# Patient Record
Sex: FEMALE | Race: WHITE | NOT HISPANIC OR LATINO | Employment: OTHER | URBAN - METROPOLITAN AREA
[De-identification: names, ages, dates, MRNs, and addresses within clinical notes are randomized per-mention and may not be internally consistent; named-entity substitution may affect disease eponyms.]

---

## 2018-08-30 ENCOUNTER — HOSPITAL ENCOUNTER (OUTPATIENT)
Dept: PREADMISSION TESTING | Facility: OTHER | Age: 48
Discharge: HOME | End: 2018-08-30
Attending: ORTHOPAEDIC SURGERY
Payer: MEDICARE

## 2018-08-30 ENCOUNTER — ANESTHESIA EVENT (OUTPATIENT)
Dept: SURGERY | Facility: OTHER | Age: 48
DRG: 470 | End: 2018-08-30
Payer: MEDICARE

## 2018-08-30 VITALS
BODY MASS INDEX: 39.27 KG/M2 | SYSTOLIC BLOOD PRESSURE: 133 MMHG | HEART RATE: 69 BPM | WEIGHT: 200 LBS | HEIGHT: 60 IN | DIASTOLIC BLOOD PRESSURE: 82 MMHG | TEMPERATURE: 98 F | OXYGEN SATURATION: 99 %

## 2018-08-30 DIAGNOSIS — T84.84XA PAIN IN PROSTHETIC JOINT, INITIAL ENCOUNTER: Primary | ICD-10-CM

## 2018-08-30 LAB
ABO + RH BLD: NORMAL
BILIRUB UR QL STRIP: NEGATIVE
BLD GP AB SCN CELLS X3 SERPL QL: NORMAL
CLARITY UR: CLEAR
COLOR UR: YELLOW
GLUCOSE UR QL STRIP: NEGATIVE
HGB UR QL STRIP: ABNORMAL
KETONES UR QL STRIP: NEGATIVE
LEUKOCYTE ESTERASE UR QL STRIP: NEGATIVE
NITRITE UR QL STRIP: NEGATIVE
PH UR STRIP: 6 [PH] (ref 5–8)
PROT UR QL STRIP: NEGATIVE
SP GR UR STRIP: >=1.03 (ref 1–1.03)
URN SPEC COLLECT METH UR: ABNORMAL
UROBILINOGEN UR STRIP-ACNC: NEGATIVE EU/DL

## 2018-08-30 PROCEDURE — 87186 SC STD MICRODIL/AGAR DIL: CPT

## 2018-08-30 PROCEDURE — 36415 COLL VENOUS BLD VENIPUNCTURE: CPT

## 2018-08-30 PROCEDURE — 86850 RBC ANTIBODY SCREEN: CPT

## 2018-08-30 PROCEDURE — 87077 CULTURE AEROBIC IDENTIFY: CPT

## 2018-08-30 PROCEDURE — 87088 URINE BACTERIA CULTURE: CPT

## 2018-08-30 PROCEDURE — 81003 URINALYSIS AUTO W/O SCOPE: CPT

## 2018-08-30 PROCEDURE — 87086 URINE CULTURE/COLONY COUNT: CPT

## 2018-08-30 RX ORDER — LIDOCAINE HYDROCHLORIDE 10 MG/ML
0.5 INJECTION, SOLUTION EPIDURAL; INFILTRATION; INTRACAUDAL; PERINEURAL ONCE
Status: CANCELLED | OUTPATIENT
Start: 2018-08-30 | End: 2018-08-30

## 2018-08-30 RX ORDER — MIDAZOLAM HYDROCHLORIDE 5 MG/ML
5 INJECTION INTRAMUSCULAR; INTRAVENOUS ONCE AS NEEDED
Status: CANCELLED | OUTPATIENT
Start: 2018-08-30 | End: 2018-08-30

## 2018-08-30 RX ORDER — SODIUM CHLORIDE, SODIUM LACTATE, POTASSIUM CHLORIDE, CALCIUM CHLORIDE 600; 310; 30; 20 MG/100ML; MG/100ML; MG/100ML; MG/100ML
INJECTION, SOLUTION INTRAVENOUS CONTINUOUS
Status: CANCELLED | OUTPATIENT
Start: 2018-08-30

## 2018-08-30 NOTE — DISCHARGE INSTRUCTIONS
PRE-ADMIT TESTING -  [phone]    [address]  [facility]          Your surgery has been scheduled at [facility]. We are pleased to have the opportunity to serve you. For Further Information please call [phone].    On the day of surgery please report to the Information Desk on the 1st floor.    · CONTACT YOUR PHYSICIAN'S OFFICE THE DAY PRIOR TO YOUR SURGERY TO OBTAIN YOUR ARRIVAL TIME.     · The evening before surgery do not eat anything after 9 p.m. ( this includes hard candy, chewing gum and mints).  You may only have GATORADE, POWERADE AND WATER  from 9 p.m. until you leave your home.   DO NOT DRINK ANY LIQUIDS ON THE WAY TO THE HOSPITAL.      SPECIAL MEDICATION INSTRUCTIONS: TAKE medications checked off by the Anesthesiologist on your Medication List.    Angiogram Patients: Take medications as instructed by your physician, including aspirin.     Surgery Patients:    If you take ASPIRIN - Your PHYSICIAN/SURGEON will need to inform you IF/OR when you need to stop taking aspirin prior to your surgery.     Do Not take any medications containing IBUPROFEN.  Do Not Wear any make-up or dark nail polish   (especially eye make-up) to surgery. If you come to surgery with makeup on you will be required to remove the makeup or nail polish.    Do not shave your surgical area at least 5 days prior to your surgery. The surgical prep will be performed at the hospital according to Infection Control regulations.    Leave all valuables at home.   Do Not wear any jewelry or watches, including any metal in body piercings.  Contact Lens must be removed before surgery. Either do not wear the contact lens or bring a case and solution for storage.  Please bring a container for eyeglasses or dentures as required.  Bring any paperwork your physician has provided, such as consent forms,  history and physicals, doctor's orders, etc.   Bring comfortable clothes that are loose fitting to wear upon  discharge. Take into consideration the type of surgery being performed.  Maintain your diet as advised per your physician the day prior to surgery.      Adequate rest the night before surgery is advised.   Park in the Parking lot behind the hospital or in the [facility] across the street from the parking lot. Parking is complimentary.  If you will be discharged the same day as your procedure, please arrange for a responsible adult to drive you home or to accompany you if traveling by taxi.   YOU WILL NOT BE PERMITTED TO DRIVE OR TO LEAVE THE HOSPITAL ALONE AFTER SURGERY.   It is strongly recommended that you arrange for someone to remain with you for the first 24 hrs following your surgery.       Thank you for your cooperation.  The Staff of [facility].        Bathing Instructions                                                                 Please shower the evening before and morning of your procedure with    ANTIBACTERIAL SOAP. ( DIAL, etc )  Concentrate on the surgical area   for at least 3 minutes and rinse completely. Dry off as usual.   Do not use any deodorant, powder, body lotions, perfume, after shave or    cologne.

## 2018-08-30 NOTE — ANESTHESIA PREPROCEDURE EVALUATION
08/30/2018  Mary Carias is a 47 y.o., female.    Anesthesia Evaluation    I have reviewed the Patient Summary Reports.    I have reviewed the Nursing Notes.   I have reviewed the Medications.     Review of Systems  Anesthesia Hx:  Denies Family Hx of Anesthesia complications.   Denies Personal Hx of Anesthesia complications.   Social:  Non-Smoker    Hematology/Oncology:  Hematology Normal   Oncology Normal     EENT/Dental:EENT/Dental Normal   Cardiovascular:  Cardiovascular Normal     Pulmonary:  Pulmonary Normal    Renal/:  Renal/ Normal     Hepatic/GI:  Hepatic/GI Normal    Musculoskeletal:   Arthritis     Neurological:   Chronic Pain Syndrome   Endocrine:  Endocrine Normal    Dermatological:  Skin Normal    Psych:   Psychiatric History (ADD) depression          Physical Exam  General:  Obesity    Airway/Jaw/Neck:  Airway Findings: Mouth Opening: Normal Mallampati: II  TM Distance: Normal, at least 6 cm      Dental:  Dental Findings: In tact         Mental Status:  Mental Status Findings:  Cooperative, Alert and Oriented         Anesthesia Plan  Type of Anesthesia, risks & benefits discussed:  Anesthesia Type:  spinal  Patient's Preference:   Intra-op Monitoring Plan: standard ASA monitors  Intra-op Monitoring Plan Comments:   Post Op Pain Control Plan: multimodal analgesia  Post Op Pain Control Plan Comments:   Induction:   IV  Beta Blocker:         Informed Consent: Patient understands risks and agrees with Anesthesia plan.  Questions answered. Anesthesia consent signed with patient.  ASA Score: 2     Day of Surgery Review of History & Physical:    H&P update referred to the surgeon.     Anesthesia Plan Notes: Pt requests heavy sedation during procedure        Ready For Surgery From Anesthesia Perspective.

## 2018-09-02 LAB — BACTERIA UR CULT: NORMAL

## 2018-09-07 NOTE — NURSING
Total Joint Replacement Questionnaire     Mary Kaushik Aretha participated in Joint Class Sept 6    1. Have you ever had a Joint Replacement?   []Yes  [x] No    2. How many stairs/steps do you have to enter your home? 1  When going up, on what side is the railing?  [] Left  [] Right  [] Bilateral  [x] None    3. Do you own any Durable Medical Equipment?   [] Rolling Walker  [] Standard Walker  [] Rollator  [] Cane  [] Crutches  [] Bed Side Commode  [] Hip Kit  [] Tub Transfer Bench  [] Shower Chair     4. Have you used a Home Health Company before?   [] Yes  [x] No  If Yes, Name of Company: na  Would you like to use this company again?   [] Yes  [] No  [x] Would you like to use your physician's preference?  [] Yes  [x] No    5. Have you arranged for someone to help you, for at least for 3 days, when you are discharged from the hospital?  [x] Yes  [] No  If Yes, Name(s) of person assisting: Jerry Wiley  9/7/2018

## 2018-09-07 NOTE — DISCHARGE INSTRUCTIONS
Hip Replacement Discharge Instructions    1. Pain:  a. After surgery you may feel some pain in the operative leg groin area. This is normal. Your hip will likely have been injected with a numbing medicine (Exparel) prior to completion of surgery for pain control. This is indicated on a green bracelet that you will wear for 4 days after surgery. You will also get a prescription for pain control before you leave the hospital.  b. Elevate your leg when sitting for comfort  2. Incision Care:  a. Some drainage from the incision in the first 72 hours is normal. If drainage is excessive, remove bandage,  pat dry, cover with sterile gauze, and secure with tape. Notify M.D. for excessive drainage.  b. Staples will be removed 14 days after surgery.  3. Activity:  a. See attached hip precautions and follow for 6 weeks (instructions found at the end of packet):  b. Perform exercises 3 times a day.  c. Use a hard, flat surface, such as a firm mattress, when exercising.  d. You may shower 2 days after surgery providing the dressing is waterproof.. Support/help is mandatory during showering. If dressing becomes wet, replace with a new dressing. No bathing or swimming for 6 weeks or until incision is completely healed.  e. Wear ghulam hose for 3 weeks after surgery. You may remove for 1-2 hours during the day only.Send patient home with an extra pair ghulam hose.  4. Safety:  a. Add cushions to low chairs and car seats for elevation.  b. When getting in and out of a car, it is important to keep your leg straight and out to the side. Wear a seatbelt at all times.  c. You will be given a raised toilet seat (3-1 commode).  d. Use a walker, cane, or crutches as long as M.D. recommends.  5. Possible Complications: Report to Surgeon  a. Infection  i. Unexpected redness  ii. Persistent drainage  iii. Temperature ,can be treated with tylenol. Do not go to the emergency room or urgent care for a temperature, call your surgeon.    iii. Additional swelling  iv. Pain not controlled with current pain medicine  b. Blood clot  i. Unusual pain  ii. Red or discolored skin  iii. Swelling  iv. Unusual warm skin  c. Dislocation  i. Severe hip pain especially when moved  ii. The injured leg is shorter than the uninjured leg  iii. The injured leg lies in an abnormal position. In most cases the leg is bent at the hip, turned inward and pulled toward the middle of the body.

## 2018-09-10 ENCOUNTER — HOSPITAL ENCOUNTER (INPATIENT)
Facility: OTHER | Age: 48
LOS: 1 days | Discharge: HOME HEALTH | DRG: 470 | End: 2018-09-11
Attending: ORTHOPAEDIC SURGERY | Admitting: ORTHOPAEDIC SURGERY
Payer: MEDICARE

## 2018-09-10 ENCOUNTER — ANESTHESIA (OUTPATIENT)
Dept: SURGERY | Facility: OTHER | Age: 48
DRG: 470 | End: 2018-09-10
Payer: MEDICARE

## 2018-09-10 DIAGNOSIS — T84.84XA PAIN IN PROSTHETIC JOINT, INITIAL ENCOUNTER: Primary | ICD-10-CM

## 2018-09-10 LAB
B-HCG UR QL: NEGATIVE
CTP QC/QA: YES

## 2018-09-10 PROCEDURE — 25000003 PHARM REV CODE 250: Performed by: ANESTHESIOLOGY

## 2018-09-10 PROCEDURE — C9290 INJ, BUPIVACAINE LIPOSOME: HCPCS | Performed by: ORTHOPAEDIC SURGERY

## 2018-09-10 PROCEDURE — 97116 GAIT TRAINING THERAPY: CPT

## 2018-09-10 PROCEDURE — 63600175 PHARM REV CODE 636 W HCPCS: Performed by: ANESTHESIOLOGY

## 2018-09-10 PROCEDURE — 25000003 PHARM REV CODE 250: Performed by: NURSE PRACTITIONER

## 2018-09-10 PROCEDURE — 71000033 HC RECOVERY, INTIAL HOUR: Performed by: ORTHOPAEDIC SURGERY

## 2018-09-10 PROCEDURE — 94761 N-INVAS EAR/PLS OXIMETRY MLT: CPT

## 2018-09-10 PROCEDURE — 97530 THERAPEUTIC ACTIVITIES: CPT

## 2018-09-10 PROCEDURE — 63600175 PHARM REV CODE 636 W HCPCS: Performed by: ORTHOPAEDIC SURGERY

## 2018-09-10 PROCEDURE — 71000039 HC RECOVERY, EACH ADD'L HOUR: Performed by: ORTHOPAEDIC SURGERY

## 2018-09-10 PROCEDURE — 37000009 HC ANESTHESIA EA ADD 15 MINS: Performed by: ORTHOPAEDIC SURGERY

## 2018-09-10 PROCEDURE — 0SRB0JA REPLACEMENT OF LEFT HIP JOINT WITH SYNTHETIC SUBSTITUTE, UNCEMENTED, OPEN APPROACH: ICD-10-PCS | Performed by: ORTHOPAEDIC SURGERY

## 2018-09-10 PROCEDURE — 27201423 OPTIME MED/SURG SUP & DEVICES STERILE SUPPLY: Performed by: ORTHOPAEDIC SURGERY

## 2018-09-10 PROCEDURE — 37000008 HC ANESTHESIA 1ST 15 MINUTES: Performed by: ORTHOPAEDIC SURGERY

## 2018-09-10 PROCEDURE — 63600175 PHARM REV CODE 636 W HCPCS: Performed by: NURSE ANESTHETIST, CERTIFIED REGISTERED

## 2018-09-10 PROCEDURE — 25000003 PHARM REV CODE 250: Performed by: ORTHOPAEDIC SURGERY

## 2018-09-10 PROCEDURE — 11000001 HC ACUTE MED/SURG PRIVATE ROOM

## 2018-09-10 PROCEDURE — 25000003 PHARM REV CODE 250: Performed by: NURSE ANESTHETIST, CERTIFIED REGISTERED

## 2018-09-10 PROCEDURE — C1713 ANCHOR/SCREW BN/BN,TIS/BN: HCPCS | Performed by: ORTHOPAEDIC SURGERY

## 2018-09-10 PROCEDURE — 97161 PT EVAL LOW COMPLEX 20 MIN: CPT

## 2018-09-10 PROCEDURE — 63600175 PHARM REV CODE 636 W HCPCS

## 2018-09-10 PROCEDURE — 36000710: Performed by: ORTHOPAEDIC SURGERY

## 2018-09-10 PROCEDURE — 94799 UNLISTED PULMONARY SVC/PX: CPT

## 2018-09-10 PROCEDURE — 25000003 PHARM REV CODE 250

## 2018-09-10 PROCEDURE — P9045 ALBUMIN (HUMAN), 5%, 250 ML: HCPCS | Mod: JG | Performed by: NURSE ANESTHETIST, CERTIFIED REGISTERED

## 2018-09-10 PROCEDURE — 36000711: Performed by: ORTHOPAEDIC SURGERY

## 2018-09-10 PROCEDURE — C1776 JOINT DEVICE (IMPLANTABLE): HCPCS | Performed by: ORTHOPAEDIC SURGERY

## 2018-09-10 PROCEDURE — 81025 URINE PREGNANCY TEST: CPT | Performed by: ANESTHESIOLOGY

## 2018-09-10 DEVICE — HEAD FEMORAL BIOLOX 36MM 0MM: Type: IMPLANTABLE DEVICE | Site: HIP | Status: FUNCTIONAL

## 2018-09-10 DEVICE — SCREW BONE 6.5X25 SELF-TAP: Type: IMPLANTABLE DEVICE | Site: HIP | Status: FUNCTIONAL

## 2018-09-10 DEVICE — LINER POLY 36MM: Type: IMPLANTABLE DEVICE | Site: HIP | Status: FUNCTIONAL

## 2018-09-10 DEVICE — CUP SHELL TM MOD W/CLUST 50MM: Type: IMPLANTABLE DEVICE | Site: HIP | Status: FUNCTIONAL

## 2018-09-10 DEVICE — SCREW BONE 6.5X35 SELF-TAP: Type: IMPLANTABLE DEVICE | Site: HIP | Status: FUNCTIONAL

## 2018-09-10 DEVICE — STEM FEMORAL 12/14 12X128MM TT: Type: IMPLANTABLE DEVICE | Site: HIP | Status: FUNCTIONAL

## 2018-09-10 RX ORDER — ONDANSETRON 2 MG/ML
INJECTION INTRAMUSCULAR; INTRAVENOUS
Status: DISCONTINUED | OUTPATIENT
Start: 2018-09-10 | End: 2018-09-10

## 2018-09-10 RX ORDER — KETOROLAC TROMETHAMINE 30 MG/ML
INJECTION, SOLUTION INTRAMUSCULAR; INTRAVENOUS
Status: DISCONTINUED | OUTPATIENT
Start: 2018-09-10 | End: 2018-09-10 | Stop reason: HOSPADM

## 2018-09-10 RX ORDER — ASPIRIN 325 MG
325 TABLET ORAL EVERY 12 HOURS
Status: DISCONTINUED | OUTPATIENT
Start: 2018-09-11 | End: 2018-09-11 | Stop reason: HOSPADM

## 2018-09-10 RX ORDER — DEXTROSE MONOHYDRATE AND SODIUM CHLORIDE 5; .9 G/100ML; G/100ML
INJECTION, SOLUTION INTRAVENOUS CONTINUOUS
Status: DISCONTINUED | OUTPATIENT
Start: 2018-09-10 | End: 2018-09-11 | Stop reason: HOSPADM

## 2018-09-10 RX ORDER — OXYCODONE HYDROCHLORIDE 5 MG/1
5 TABLET ORAL
Status: DISCONTINUED | OUTPATIENT
Start: 2018-09-10 | End: 2018-09-10 | Stop reason: HOSPADM

## 2018-09-10 RX ORDER — LIDOCAINE HYDROCHLORIDE 10 MG/ML
0.5 INJECTION, SOLUTION EPIDURAL; INFILTRATION; INTRACAUDAL; PERINEURAL ONCE
Status: DISCONTINUED | OUTPATIENT
Start: 2018-09-10 | End: 2018-09-10 | Stop reason: HOSPADM

## 2018-09-10 RX ORDER — CEFAZOLIN SODIUM 2 G/50ML
2 SOLUTION INTRAVENOUS
Status: COMPLETED | OUTPATIENT
Start: 2018-09-10 | End: 2018-09-11

## 2018-09-10 RX ORDER — CEFAZOLIN SODIUM 2 G/50ML
2 SOLUTION INTRAVENOUS
Status: COMPLETED | OUTPATIENT
Start: 2018-09-10 | End: 2018-09-10

## 2018-09-10 RX ORDER — CELECOXIB 200 MG/1
200 CAPSULE ORAL 2 TIMES DAILY
Status: DISCONTINUED | OUTPATIENT
Start: 2018-09-10 | End: 2018-09-11 | Stop reason: HOSPADM

## 2018-09-10 RX ORDER — HYDROMORPHONE HYDROCHLORIDE 1 MG/ML
0.5 INJECTION, SOLUTION INTRAMUSCULAR; INTRAVENOUS; SUBCUTANEOUS EVERY 4 HOURS PRN
Status: DISCONTINUED | OUTPATIENT
Start: 2018-09-10 | End: 2018-09-11 | Stop reason: HOSPADM

## 2018-09-10 RX ORDER — TRANEXAMIC ACID 100 MG/ML
INJECTION, SOLUTION INTRAVENOUS
Status: DISCONTINUED | OUTPATIENT
Start: 2018-09-10 | End: 2018-09-10

## 2018-09-10 RX ORDER — BUPIVACAINE HCL/EPINEPHRINE 0.25-.0005
VIAL (ML) INJECTION
Status: DISCONTINUED | OUTPATIENT
Start: 2018-09-10 | End: 2018-09-10 | Stop reason: HOSPADM

## 2018-09-10 RX ORDER — FENTANYL CITRATE 50 UG/ML
25 INJECTION, SOLUTION INTRAMUSCULAR; INTRAVENOUS EVERY 5 MIN PRN
Status: DISCONTINUED | OUTPATIENT
Start: 2018-09-10 | End: 2018-09-10 | Stop reason: HOSPADM

## 2018-09-10 RX ORDER — HYDROMORPHONE HYDROCHLORIDE 2 MG/ML
0.4 INJECTION, SOLUTION INTRAMUSCULAR; INTRAVENOUS; SUBCUTANEOUS EVERY 5 MIN PRN
Status: DISCONTINUED | OUTPATIENT
Start: 2018-09-10 | End: 2018-09-10 | Stop reason: HOSPADM

## 2018-09-10 RX ORDER — MUPIROCIN 20 MG/G
1 OINTMENT TOPICAL 2 TIMES DAILY
Status: DISCONTINUED | OUTPATIENT
Start: 2018-09-10 | End: 2018-09-11 | Stop reason: HOSPADM

## 2018-09-10 RX ORDER — ROPIVACAINE HYDROCHLORIDE 5 MG/ML
INJECTION, SOLUTION EPIDURAL; INFILTRATION; PERINEURAL
Status: COMPLETED | OUTPATIENT
Start: 2018-09-10 | End: 2018-09-10

## 2018-09-10 RX ORDER — DOCUSATE SODIUM 100 MG/1
100 CAPSULE, LIQUID FILLED ORAL EVERY 12 HOURS
Status: DISCONTINUED | OUTPATIENT
Start: 2018-09-10 | End: 2018-09-11 | Stop reason: HOSPADM

## 2018-09-10 RX ORDER — ONDANSETRON 2 MG/ML
4 INJECTION INTRAMUSCULAR; INTRAVENOUS EVERY 12 HOURS PRN
Status: DISCONTINUED | OUTPATIENT
Start: 2018-09-10 | End: 2018-09-11 | Stop reason: HOSPADM

## 2018-09-10 RX ORDER — ACETAMINOPHEN 10 MG/ML
1000 INJECTION, SOLUTION INTRAVENOUS
Status: COMPLETED | OUTPATIENT
Start: 2018-09-10 | End: 2018-09-10

## 2018-09-10 RX ORDER — POLYETHYLENE GLYCOL 3350 17 G/17G
17 POWDER, FOR SOLUTION ORAL DAILY
Status: DISCONTINUED | OUTPATIENT
Start: 2018-09-11 | End: 2018-09-11 | Stop reason: HOSPADM

## 2018-09-10 RX ORDER — SODIUM CHLORIDE, SODIUM LACTATE, POTASSIUM CHLORIDE, CALCIUM CHLORIDE 600; 310; 30; 20 MG/100ML; MG/100ML; MG/100ML; MG/100ML
INJECTION, SOLUTION INTRAVENOUS CONTINUOUS
Status: DISCONTINUED | OUTPATIENT
Start: 2018-09-10 | End: 2018-09-10

## 2018-09-10 RX ORDER — MIDAZOLAM HYDROCHLORIDE 1 MG/ML
INJECTION INTRAMUSCULAR; INTRAVENOUS
Status: DISCONTINUED | OUTPATIENT
Start: 2018-09-10 | End: 2018-09-10

## 2018-09-10 RX ORDER — SODIUM CHLORIDE 0.9 % (FLUSH) 0.9 %
3 SYRINGE (ML) INJECTION
Status: DISCONTINUED | OUTPATIENT
Start: 2018-09-10 | End: 2018-09-10

## 2018-09-10 RX ORDER — OXYCODONE HYDROCHLORIDE 5 MG/1
20 TABLET ORAL EVERY 4 HOURS PRN
Status: DISCONTINUED | OUTPATIENT
Start: 2018-09-10 | End: 2018-09-11 | Stop reason: HOSPADM

## 2018-09-10 RX ORDER — MEPERIDINE HYDROCHLORIDE 50 MG/ML
12.5 INJECTION INTRAMUSCULAR; INTRAVENOUS; SUBCUTANEOUS ONCE AS NEEDED
Status: DISCONTINUED | OUTPATIENT
Start: 2018-09-10 | End: 2018-09-10 | Stop reason: HOSPADM

## 2018-09-10 RX ORDER — SODIUM CHLORIDE 0.9 % (FLUSH) 0.9 %
5 SYRINGE (ML) INJECTION
Status: DISCONTINUED | OUTPATIENT
Start: 2018-09-10 | End: 2018-09-11 | Stop reason: HOSPADM

## 2018-09-10 RX ORDER — FAMOTIDINE 20 MG/1
20 TABLET, FILM COATED ORAL 2 TIMES DAILY
Status: DISCONTINUED | OUTPATIENT
Start: 2018-09-10 | End: 2018-09-11 | Stop reason: HOSPADM

## 2018-09-10 RX ORDER — ONDANSETRON 2 MG/ML
4 INJECTION INTRAMUSCULAR; INTRAVENOUS DAILY PRN
Status: DISCONTINUED | OUTPATIENT
Start: 2018-09-10 | End: 2018-09-10 | Stop reason: HOSPADM

## 2018-09-10 RX ORDER — BISACODYL 10 MG
10 SUPPOSITORY, RECTAL RECTAL DAILY PRN
Status: DISCONTINUED | OUTPATIENT
Start: 2018-09-10 | End: 2018-09-11 | Stop reason: HOSPADM

## 2018-09-10 RX ORDER — DIPHENHYDRAMINE HYDROCHLORIDE 50 MG/ML
25 INJECTION INTRAMUSCULAR; INTRAVENOUS EVERY 8 HOURS PRN
Status: DISCONTINUED | OUTPATIENT
Start: 2018-09-10 | End: 2018-09-11 | Stop reason: HOSPADM

## 2018-09-10 RX ORDER — FENTANYL CITRATE 50 UG/ML
INJECTION, SOLUTION INTRAMUSCULAR; INTRAVENOUS
Status: DISCONTINUED | OUTPATIENT
Start: 2018-09-10 | End: 2018-09-10

## 2018-09-10 RX ORDER — MIDAZOLAM HYDROCHLORIDE 5 MG/ML
5 INJECTION INTRAMUSCULAR; INTRAVENOUS ONCE AS NEEDED
Status: COMPLETED | OUTPATIENT
Start: 2018-09-10 | End: 2018-09-10

## 2018-09-10 RX ORDER — PROPOFOL 10 MG/ML
VIAL (ML) INTRAVENOUS CONTINUOUS PRN
Status: DISCONTINUED | OUTPATIENT
Start: 2018-09-10 | End: 2018-09-10

## 2018-09-10 RX ORDER — PHENYLEPHRINE HYDROCHLORIDE 10 MG/ML
INJECTION INTRAVENOUS
Status: DISCONTINUED | OUTPATIENT
Start: 2018-09-10 | End: 2018-09-10

## 2018-09-10 RX ORDER — ACETAMINOPHEN 10 MG/ML
1000 INJECTION, SOLUTION INTRAVENOUS EVERY 8 HOURS
Status: DISCONTINUED | OUTPATIENT
Start: 2018-09-10 | End: 2018-09-11 | Stop reason: HOSPADM

## 2018-09-10 RX ORDER — CLONAZEPAM 0.5 MG/1
0.5 TABLET ORAL DAILY PRN
Status: DISCONTINUED | OUTPATIENT
Start: 2018-09-10 | End: 2018-09-11 | Stop reason: HOSPADM

## 2018-09-10 RX ORDER — LIDOCAINE HCL/PF 100 MG/5ML
SYRINGE (ML) INTRAVENOUS
Status: DISCONTINUED | OUTPATIENT
Start: 2018-09-10 | End: 2018-09-10

## 2018-09-10 RX ORDER — DEXAMETHASONE SODIUM PHOSPHATE 4 MG/ML
INJECTION, SOLUTION INTRA-ARTICULAR; INTRALESIONAL; INTRAMUSCULAR; INTRAVENOUS; SOFT TISSUE
Status: DISCONTINUED | OUTPATIENT
Start: 2018-09-10 | End: 2018-09-10

## 2018-09-10 RX ORDER — PROPOFOL 10 MG/ML
VIAL (ML) INTRAVENOUS
Status: DISCONTINUED | OUTPATIENT
Start: 2018-09-10 | End: 2018-09-10

## 2018-09-10 RX ORDER — OXYCODONE HYDROCHLORIDE 5 MG/1
10 TABLET ORAL EVERY 4 HOURS PRN
Status: DISCONTINUED | OUTPATIENT
Start: 2018-09-10 | End: 2018-09-11 | Stop reason: HOSPADM

## 2018-09-10 RX ORDER — OXYCODONE HYDROCHLORIDE 5 MG/1
5 TABLET ORAL EVERY 4 HOURS PRN
Status: DISCONTINUED | OUTPATIENT
Start: 2018-09-10 | End: 2018-09-11 | Stop reason: HOSPADM

## 2018-09-10 RX ORDER — EPHEDRINE SULFATE 50 MG/ML
INJECTION, SOLUTION INTRAVENOUS
Status: DISCONTINUED | OUTPATIENT
Start: 2018-09-10 | End: 2018-09-10

## 2018-09-10 RX ORDER — BACITRACIN 50000 [IU]/1
INJECTION, POWDER, FOR SOLUTION INTRAMUSCULAR
Status: DISCONTINUED | OUTPATIENT
Start: 2018-09-10 | End: 2018-09-10 | Stop reason: HOSPADM

## 2018-09-10 RX ORDER — ALBUMIN HUMAN 50 G/1000ML
SOLUTION INTRAVENOUS CONTINUOUS PRN
Status: DISCONTINUED | OUTPATIENT
Start: 2018-09-10 | End: 2018-09-10

## 2018-09-10 RX ADMIN — ACETAMINOPHEN 1000 MG: 10 INJECTION, SOLUTION INTRAVENOUS at 02:09

## 2018-09-10 RX ADMIN — CEFAZOLIN SODIUM 2 G: 2 SOLUTION INTRAVENOUS at 02:09

## 2018-09-10 RX ADMIN — EPHEDRINE SULFATE 10 MG: 50 INJECTION INTRAMUSCULAR; INTRAVENOUS; SUBCUTANEOUS at 02:09

## 2018-09-10 RX ADMIN — DEXTROSE AND SODIUM CHLORIDE: 5; .9 INJECTION, SOLUTION INTRAVENOUS at 07:09

## 2018-09-10 RX ADMIN — PROPOFOL 125 MCG/KG/MIN: 10 INJECTION, EMULSION INTRAVENOUS at 02:09

## 2018-09-10 RX ADMIN — ALBUMIN (HUMAN): 2.5 SOLUTION INTRAVENOUS at 02:09

## 2018-09-10 RX ADMIN — OXYCODONE HYDROCHLORIDE 20 MG: 5 TABLET ORAL at 09:09

## 2018-09-10 RX ADMIN — MIDAZOLAM HYDROCHLORIDE 2 MG: 1 INJECTION, SOLUTION INTRAMUSCULAR; INTRAVENOUS at 02:09

## 2018-09-10 RX ADMIN — ROPIVACAINE HYDROCHLORIDE 3 ML: 5 INJECTION, SOLUTION EPIDURAL; INFILTRATION; PERINEURAL at 02:09

## 2018-09-10 RX ADMIN — DOCUSATE SODIUM 100 MG: 100 CAPSULE, LIQUID FILLED ORAL at 08:09

## 2018-09-10 RX ADMIN — CEFAZOLIN SODIUM 2 G: 2 SOLUTION INTRAVENOUS at 09:09

## 2018-09-10 RX ADMIN — LIDOCAINE HYDROCHLORIDE 50 MG: 20 INJECTION, SOLUTION INTRAVENOUS at 02:09

## 2018-09-10 RX ADMIN — OXYCODONE HYDROCHLORIDE 10 MG: 5 TABLET ORAL at 08:09

## 2018-09-10 RX ADMIN — CELECOXIB 200 MG: 200 CAPSULE ORAL at 08:09

## 2018-09-10 RX ADMIN — HYDROMORPHONE HYDROCHLORIDE 0.5 MG: 1 INJECTION, SOLUTION INTRAMUSCULAR; INTRAVENOUS; SUBCUTANEOUS at 07:09

## 2018-09-10 RX ADMIN — VANCOMYCIN HYDROCHLORIDE 1250 MG: 1 INJECTION, POWDER, LYOPHILIZED, FOR SOLUTION INTRAVENOUS at 01:09

## 2018-09-10 RX ADMIN — ONDANSETRON 4 MG: 2 INJECTION INTRAMUSCULAR; INTRAVENOUS at 02:09

## 2018-09-10 RX ADMIN — MUPIROCIN 1 G: 20 OINTMENT TOPICAL at 08:09

## 2018-09-10 RX ADMIN — PROPOFOL 50 MG: 10 INJECTION, EMULSION INTRAVENOUS at 02:09

## 2018-09-10 RX ADMIN — FENTANYL CITRATE 100 MCG: 50 INJECTION, SOLUTION INTRAMUSCULAR; INTRAVENOUS at 02:09

## 2018-09-10 RX ADMIN — MIDAZOLAM HYDROCHLORIDE 5 MG: 5 INJECTION, SOLUTION INTRAMUSCULAR; INTRAVENOUS at 01:09

## 2018-09-10 RX ADMIN — OXYCODONE HYDROCHLORIDE 5 MG: 5 TABLET ORAL at 05:09

## 2018-09-10 RX ADMIN — SODIUM CHLORIDE, SODIUM LACTATE, POTASSIUM CHLORIDE, AND CALCIUM CHLORIDE: 600; 310; 30; 20 INJECTION, SOLUTION INTRAVENOUS at 01:09

## 2018-09-10 RX ADMIN — PHENYLEPHRINE HYDROCHLORIDE 100 MCG: 10 INJECTION INTRAVENOUS at 03:09

## 2018-09-10 RX ADMIN — FAMOTIDINE 20 MG: 20 TABLET ORAL at 08:09

## 2018-09-10 RX ADMIN — TRANEXAMIC ACID 1800 MG: 100 INJECTION, SOLUTION INTRAVENOUS at 02:09

## 2018-09-10 RX ADMIN — CLONAZEPAM 0.5 MG: 0.5 TABLET ORAL at 10:09

## 2018-09-10 RX ADMIN — DEXAMETHASONE SODIUM PHOSPHATE 8 MG: 4 INJECTION, SOLUTION INTRAMUSCULAR; INTRAVENOUS at 02:09

## 2018-09-10 RX ADMIN — ACETAMINOPHEN 1000 MG: 10 INJECTION, SOLUTION INTRAVENOUS at 09:09

## 2018-09-10 RX ADMIN — VANCOMYCIN HYDROCHLORIDE 1250 MG: 1 INJECTION, POWDER, LYOPHILIZED, FOR SOLUTION INTRAVENOUS at 02:09

## 2018-09-10 NOTE — ANESTHESIA POSTPROCEDURE EVALUATION
Anesthesia Post Evaluation    Patient: Mary Carias    Procedure(s) Performed: Procedure(s) (LRB):  ARTHROPLASTY, HIP CONVERSION -REVISION TO TOTAL (Left)    Final Anesthesia Type: spinal  Patient location during evaluation: PACU  Patient participation: Yes- Able to Participate  Level of consciousness: oriented and awake  Post-procedure vital signs: reviewed and stable  Pain management: adequate  Airway patency: patent  PONV status at discharge: No PONV  Anesthetic complications: no      Cardiovascular status: hemodynamically stable  Respiratory status: unassisted, spontaneous ventilation and room air  Hydration status: euvolemic  Follow-up not needed.        Visit Vitals  BP (!) 111/58   Pulse 91   Temp 36.6 °C (97.8 °F) (Oral)   Resp 18   Ht 5' (1.524 m)   Wt 90.7 kg (199 lb 16 oz)   SpO2 96%   BMI 39.06 kg/m²       Pain/Flakito Score: Pain Assessment Performed: Yes (9/10/2018  3:53 PM)  Presence of Pain: complains of pain/discomfort (9/10/2018  5:27 PM)  Pain Rating Prior to Med Admin: 4 (9/10/2018  5:27 PM)  Flakito Score: 10 (9/10/2018  5:25 PM)

## 2018-09-10 NOTE — OP NOTE
[facility]  Operative Report    SUMMARY     Surgery Date: 9/10/2018     Surgeon(s) and Role:     * Maicol Hogan MD - Primary    Assistant: ANUM Avlarado FA    Pre-op Diagnosis:  Post-traumatic arthritis Left hip    Post-op Diagnosis:      Same    Procedure(s) (LRB):  ARTHROPLASTY, HIP CONVERSION -REVISION TO TOTAL (Left) (Sarita TM hip)    Anesthesia: Spinal    Description of Procedure: 47 y.o. Female s/p ORIF of acetabulum left hip with post-traumatic arthritis.  Appropriate consent was signed. The patient understood   and accepted all risks and complications. The patient was brought to the Operating Room after undergoing spinal anesthetic. Connelly catheter was placed. The patient was then placed in a lateral decubitus position on a peg board with all bony   prominences padded. Perineal drape was applied. The Operative hip and lower extremity were then prepped and draped in a sterile manner and a mini posterior approach was utilized. Dissection was taken down to fascia, which was incised and   gluteus nikolai muscle split in line of its fibers.The Charnley retractor was then   placed and the hip internally rotated. The external rotators and capsule were   taken off as one flap and peeled back to protect the sciatic nerve. It was   tagged with #5 Ethibond suture. Leg was then levelled and 2 pins were placed, 1  in the greater trochanter and 1 in the iliac crest and distance measured 9.8  cm. Pin was then removed from greater trochanter and hip dislocated. A femoral  neck osteotomy was performed in 20 mm above the lesser trochanter as   templated on preoperative x-rays. Femoral head was removed and proximal femur   was exposed. It was opened up with the cookie cutter, starter reamer and   lateralizing reamer. Trabecular metal reamers were utilized up to 12 mm and   broaching was performed to 12 mm. A thrombin-soaked sponge is placed in the   proximal femur and attention was then directed to the  acetabulum.    Labrum and soft tissue were excised and reaming was begun with a 46 mm reamer   and progressed to 50 mm. A 50 mm press-fit trabecular metal cup with cluster   holes was then impacted obtaining excellent fixation. 2 dome screws were placed  enhancing fixation. A 36 mm neutral highly cross-linked polyethylene liner was  then snapped in the place and checked for loosening. Osteophytes were removed   from around the acetabulum.    Attention was then directed back to the proximal femur where the trial 12 mm   trabecular metal broach was placed and trials were performed. A standard neck   was required along with a 36 mm head plus 0 mm neck. Leg length measured 10.7   cm. Broach is then removed and a 12 mm  trabecular metal stem was then impacted in the proximal femur obtaining excellent fixation. A 36 mm   ceramic head +0 mm neck was then impacted on the dried trunnion   relocated brought through full range of motion and found to be quite stable.   The hip was then washed out copiously with antibiotic solution through the   Pulsavac. The external rotators and capsule were reattached to trochanteric   attachment through drill holes utilizing #5 Ethibond suture. Exparel cocktail   was injected into the fascia and subcutaneous tissue. Fascia was closed with a   running #2 Quill suture. Subcutaneous closure was obtained with #1 and 2-0   Vicryl. Skin was then closed with staples and a sterile compressive dressing   was applied (JAZZ drain). The patient was placed in abduction pillow and brought to Recovery  Room in good condition.    Estimated Blood Loss: 300 cc         Specimens:   Specimen (12h ago, onward)    None

## 2018-09-10 NOTE — TRANSFER OF CARE
Anesthesia Transfer of Care Note    Patient: Mary Carias    Procedure(s) Performed: Procedure(s) (LRB):  ARTHROPLASTY, HIP CONVERSION -REVISION TO TOTAL (Left)    Patient location: PACU    Anesthesia Type: spinal    Transport from OR: Transported from OR on 2-3 L/min O2 by NC with adequate spontaneous ventilation    Post pain: adequate analgesia    Post assessment: no apparent anesthetic complications and tolerated procedure well    Post vital signs: stable    Level of consciousness: awake, alert and oriented    Nausea/Vomiting: no nausea/vomiting    Complications: none    Transfer of care protocol was followed      Last vitals:   Visit Vitals  /61   Pulse 80   Temp 37.1 °C (98.8 °F) (Oral)   Resp 18   Ht 5' (1.524 m)   Wt 90.7 kg (199 lb 16 oz)   SpO2 97%   BMI 39.06 kg/m²

## 2018-09-10 NOTE — PLAN OF CARE
[facility]       [address]       [facility] [address]       [phone]               [facility] Orthopedic Discharge Orders    Home Sam           Expected Discharge Date: 9/11    Diagnoses:  Post-op  left hip(s) replacement    Patient is homebound due to:   Pt requires home health services due to taxing effort to leave the home as a result of immobility from Post-op left hip(s) replacement    Weight Bearing Status:   full weight bearing: left leg     Posterior Hip Precautions for 6 weeks, AVOID:  -Avoid greater than 90 degrees of flexion, internal rotation, and adduction  -Avoid extension, external rotation, and abduction  -Must sleep with hip abduction pillow or regular pillow b/t legs    Physical Therapy   3 times a week for 2 weeks (Call for further orders)  - Ambulate with a rolling walker  - Progress to cane  -Instruct on ROM and strengthening of knee  -Set up for outpt once staples removed and MOD 1 with cane    Wound Care:   If patient is discharged with aqua molly/silver dressing, leave on for 5 days unless saturated border to border, then follow instructions below:  Cleanse with wound cleanser or normal saline and apply island dressing.  If island dressing not available apply gauze and tegaderm.  Change surgical dressing 3 times a week and PRN  in standing position.  Teach patient to change daily if draining.  Pt may shower if surgical dressing is waterproof.. Removal and replacement of dressing after shower only needed if incision is suspected to have gotten wet during shower.  Otherwise change as previously described depending on dressing/drainage. No soaking in the tub or hot tub  for 6 weeks.    Wear  TEDS Bilateral Knee High Stockings for 3  Weeks. OK to remove stockings 1-2 hours/day max if desired.    PT/SN to remove staples 14 days Post-op and apply skin prep and steri-strips.    Cold therapy/Ice: encouraged at least 20 minutes 2-3 times daily or more if desired.   Incision must be kept waterproof while icing.      Contact:  Please contact the nurse practitioner, Milady at [phone] at Ext [phone]. with concerns.  She is in surgery M,W,F so if urgent and needs to be addressed prior to the end of the day call the  and they with contact her in the OR or Clinic.     BLOOD THINNER:    If sent home on Xarelto         -14 days post-op for TKR       -30 days post-op for THR     If sent home home on ASA    325mg   BID x 4 weeks     Once finished with prescribed blood thinner, patients can return to pre-surgical ASA dosage if they took ASA before surgery.     Outpatient Therapy: [facility]    [address] [address]   or  [address] [place] [address]    Call [phone] to schedule appointment  Fax [phone]    If need orders: Call Nicol at Ext [phone]        DME:  - rolling Walker  - 3 in 1 commode  - tub bench / shower chair  - Hip Kit  - Per PT/OT recommendation  - Other:Huan Hogan

## 2018-09-10 NOTE — ANESTHESIA PROCEDURE NOTES
Spinal    Diagnosis: Hip revision  Patient location during procedure: holding area  Start time: 9/10/2018 1:59 PM  Timeout: 9/10/2018 1:57 PM  End time: 9/10/2018 2:10 PM  Staffing  Anesthesiologist: Migue South MD  Performed: anesthesiologist   Preanesthetic Checklist  Completed: patient identified, site marked, surgical consent, pre-op evaluation, timeout performed, IV checked, risks and benefits discussed and monitors and equipment checked  Spinal Block  Patient position: sitting  Prep: ChloraPrep  Patient monitoring: heart rate, cardiac monitor and continuous pulse ox  Approach: right paramedian  Location: L3-4  Injection technique: single shot  CSF Fluid: clear free-flowing CSF  Needle  Needle type: pencil-tip   Needle gauge: 22 G  Needle length: 3.5 in  Additional Documentation: incremental injection, negative aspiration for heme and no paresthesia on injection  Needle localization: anatomical landmarks  Assessment  Sensory level: T8   Dermatomal levels determined by pinch or prick  Ease of block: easy  Patient's tolerance of the procedure: no complaints

## 2018-09-10 NOTE — INTERVAL H&P NOTE
The patient has been examined and the H&P has been reviewed:    I concur with the findings and no changes have occurred since H&P was written.    Anesthesia/Surgery risks, benefits and alternative options discussed and understood by patient/family.          Active Hospital Problems    Diagnosis  POA    Artificial joint pain [T84.84XA]  Yes      Resolved Hospital Problems   No resolved problems to display.

## 2018-09-11 VITALS
OXYGEN SATURATION: 95 % | BODY MASS INDEX: 39.27 KG/M2 | DIASTOLIC BLOOD PRESSURE: 57 MMHG | HEART RATE: 74 BPM | WEIGHT: 200 LBS | SYSTOLIC BLOOD PRESSURE: 112 MMHG | RESPIRATION RATE: 17 BRPM | HEIGHT: 60 IN | TEMPERATURE: 99 F

## 2018-09-11 LAB
ERYTHROCYTE [DISTWIDTH] IN BLOOD BY AUTOMATED COUNT: 13.6 %
HCT VFR BLD AUTO: 36.7 %
HGB BLD-MCNC: 12 G/DL
MCH RBC QN AUTO: 28 PG
MCHC RBC AUTO-ENTMCNC: 32.7 G/DL
MCV RBC AUTO: 86 FL
PLATELET # BLD AUTO: 297 K/UL
PMV BLD AUTO: 9.1 FL
RBC # BLD AUTO: 4.29 M/UL
WBC # BLD AUTO: 15.81 K/UL

## 2018-09-11 PROCEDURE — 25000003 PHARM REV CODE 250: Performed by: ORTHOPAEDIC SURGERY

## 2018-09-11 PROCEDURE — 97535 SELF CARE MNGMENT TRAINING: CPT

## 2018-09-11 PROCEDURE — 25000003 PHARM REV CODE 250

## 2018-09-11 PROCEDURE — 63600175 PHARM REV CODE 636 W HCPCS

## 2018-09-11 PROCEDURE — 97166 OT EVAL MOD COMPLEX 45 MIN: CPT

## 2018-09-11 PROCEDURE — 36415 COLL VENOUS BLD VENIPUNCTURE: CPT

## 2018-09-11 PROCEDURE — 97530 THERAPEUTIC ACTIVITIES: CPT

## 2018-09-11 PROCEDURE — 85027 COMPLETE CBC AUTOMATED: CPT

## 2018-09-11 PROCEDURE — 97116 GAIT TRAINING THERAPY: CPT

## 2018-09-11 RX ORDER — ASPIRIN 325 MG
325 TABLET ORAL EVERY 12 HOURS
Refills: 0
Start: 2018-09-11 | End: 2018-10-11

## 2018-09-11 RX ORDER — OXYCODONE AND ACETAMINOPHEN 10; 325 MG/1; MG/1
TABLET ORAL
Qty: 90 TABLET | Refills: 0 | Status: SHIPPED | OUTPATIENT
Start: 2018-09-11

## 2018-09-11 RX ADMIN — ACETAMINOPHEN 1000 MG: 10 INJECTION, SOLUTION INTRAVENOUS at 05:09

## 2018-09-11 RX ADMIN — POLYETHYLENE GLYCOL 3350 17 G: 17 POWDER, FOR SOLUTION ORAL at 09:09

## 2018-09-11 RX ADMIN — OXYCODONE HYDROCHLORIDE 20 MG: 5 TABLET ORAL at 02:09

## 2018-09-11 RX ADMIN — FAMOTIDINE 20 MG: 20 TABLET ORAL at 09:09

## 2018-09-11 RX ADMIN — CEFAZOLIN SODIUM 2 G: 2 SOLUTION INTRAVENOUS at 05:09

## 2018-09-11 RX ADMIN — MUPIROCIN 1 G: 20 OINTMENT TOPICAL at 09:09

## 2018-09-11 RX ADMIN — ASPIRIN 325 MG ORAL TABLET 325 MG: 325 PILL ORAL at 09:09

## 2018-09-11 RX ADMIN — DOCUSATE SODIUM 100 MG: 100 CAPSULE, LIQUID FILLED ORAL at 09:09

## 2018-09-11 RX ADMIN — OXYCODONE HYDROCHLORIDE 20 MG: 5 TABLET ORAL at 11:09

## 2018-09-11 RX ADMIN — VANCOMYCIN HYDROCHLORIDE 1250 MG: 1 INJECTION, POWDER, LYOPHILIZED, FOR SOLUTION INTRAVENOUS at 02:09

## 2018-09-11 RX ADMIN — OXYCODONE HYDROCHLORIDE 20 MG: 5 TABLET ORAL at 06:09

## 2018-09-11 RX ADMIN — CELECOXIB 200 MG: 200 CAPSULE ORAL at 09:09

## 2018-09-11 NOTE — PT/OT/SLP DISCHARGE
Physical Therapy Discharge Summary    Name: Mary Carias  MRN: [id number]   Principal Problem: Artificial joint pain     Patient Discharged from acute Physical Therapy on 18.  Please refer to prior PT noted date on 18 for functional status.     Assessment:     pt has met 2 out of 5 goals.      Objective:     GOALS:   Multidisciplinary Problems     Physical Therapy Goals        Problem: Physical Therapy Goal    Goal Priority Disciplines Outcome Goal Variances Interventions   Physical Therapy Goal     PT, PT/OT      Description:  Goals to be met by: 18    Patient will increase functional independence with mobility by performin. Supine to sit with SBA   2. Sit to supine with SBA.   3. Sit<>stand transfer with SBA using rolling walker.   4. Gait > 150 feet with SBA using rolling walker. MET 18  5. Ascend/descend 3 stairs with R  handrails with min a MET 18                       Reasons for Discontinuation of Therapy Services  Transfer to alternate level of care.      Plan:     Patient Discharged to: Home with Home Health Service.    Tosin Browne, PT  2018

## 2018-09-11 NOTE — PROGRESS NOTES
Progress Note  Orthopedics    Admit Date: 9/10/2018   Patient ID: Mary Carias is a 47 y.o. female.  POD#1 L THR.  Doing well. Dressing dry, JAZZ drain in place.  Amb 100 ft.  NV intact. OK for DC today.            Maicol Hogan      Vital Sign (recent):  BP (!) 105/57 (BP Location: Right arm, Patient Position: Lying)   Pulse 81   Temp 97.5 °F (36.4 °C) (Oral)   Resp 18   Ht 5' (1.524 m)   Wt 90.7 kg (199 lb 16 oz)   SpO2 96%   BMI 39.06 kg/m²       Laboratory:    CBC:   Recent Labs   Lab  09/11/18   0457   WBC  15.81*   RBC  4.29   HGB  12.0   HCT  36.7*   PLT  297   MCV  86   MCH  28.0   MCHC  32.7       CMP: No results for input(s): GLU, CALCIUM, ALBUMIN, PROT, NA, K, CO2, CL, BUN, CREATININE, ALKPHOS, ALT, AST, BILITOT in the last 168 hours.        Intake/Output Summary (Last 24 hours) at 9/11/2018 0831  Last data filed at 9/11/2018 0530  Gross per 24 hour   Intake 3541.67 ml   Output 2525 ml   Net 1016.67 ml         Current Medications:   acetaminophen  1,000 mg Intravenous Q8H    aspirin  325 mg Oral Q12H    celecoxib  200 mg Oral BID    docusate sodium  100 mg Oral Q12H    famotidine  20 mg Oral BID    mupirocin  1 g Nasal BID    polyethylene glycol  17 g Oral Daily       Continuous Infusions:   dextrose 5 % and 0.9 % NaCl 100 mL/hr at 09/10/18 1948     PRN Meds:.bisacodyl, clonazePAM, diphenhydrAMINE, HYDROmorphone, ondansetron, oxyCODONE, oxyCODONE, oxyCODONE, promethazine (PHENERGAN) IVPB, sodium chloride 0.9%

## 2018-09-11 NOTE — PLAN OF CARE
Problem: Physical Therapy Goal  Goal: Physical Therapy Goal  Goals to be met by: 18    Patient will increase functional independence with mobility by performin. Supine to sit with SBA   2. Sit to supine with SBA.   3. Sit<>stand transfer with SBA using rolling walker.   4. Gait > 150 feet with SBA using rolling walker. MET 18  5. Ascend/descend 3 stairs with R  handrails with min a MET 18       Patient required SBA/CGA for functional mobility

## 2018-09-11 NOTE — PT/OT/SLP PROGRESS
Physical Therapy Treatment    Patient Name:  Mary Carias   MRN:  [id number]    Recommendations:     Discharge Recommendations:  home health PT   Discharge Equipment Recommendations: none(already delivered )   Barriers to discharge: Inaccessible home and Decreased caregiver support    Assessment:     Mary Carias is a 47 y.o. female admitted with a medical diagnosis of Artificial joint pain.  She presents with the following impairments/functional limitations:  weakness, impaired endurance, impaired self care skills, decreased safety awareness, orthopedic precautions, decreased ROM, decreased lower extremity function, impaired functional mobilty patient with poor safety awareness and required constant verbal cues for maintaining posterior precautions. Patient was constantly twisting and reaching throughout treatment; required constant re education.     Rehab Prognosis:  good; patient would benefit from acute skilled PT services to address these deficits and reach maximum level of function.      Recent Surgery: Procedure(s) (LRB):  ARTHROPLASTY, HIP CONVERSION -REVISION TO TOTAL (Left) 1 Day Post-Op    Plan:     During this hospitalization, patient to be seen BID to address the above listed problems via gait training, therapeutic activities, therapeutic exercises  · Plan of Care Expires:  10/10/18   Plan of Care Reviewed with: patient    Subjective     Communicated with nurse prior to session.  Patient found standing in bathroom with out walker upon PT entry to room, agreeable to treatment.      Chief Complaint: patient complained about not having enough pain medication upon discharge   Patient comments/goals: patient reported I'm ready   Pain/Comfort:  · Pain Rating 1: 2/10  · Location - Side 1: Left  · Location - Orientation 1: generalized  · Location 1: hip  · Pain Addressed 1: Pre-medicate for activity, Distraction  · Pain Rating Post-Intervention 1: 0/10(at rest seated in bedside  chair )    Patients cultural, spiritual, Zoroastrianism conflicts given the current situation: none stated    Objective:     Patient found with: peripheral IV     General Precautions: Standard, fall   Orthopedic Precautions:LLE weight bearing as tolerated, LLE posterior precautions   Braces: N/A     Functional Mobility:  · Stand to sit with SBA and verbal cues to maintain posterior precautions and proper technique.   · Sit to stand from bedside commode, bedside chair with Rolling walker with SBA constant verbal cues for hand placement and no twisting.   · Patient gait trained 350 feet with Rolling walker with CGA/SBA verbal cues for safety. Patient required constant verbal cues not to twist hips with turning.     AM-PAC 6 CLICK MOBILITY  Turning over in bed (including adjusting bedclothes, sheets and blankets)?: 3  Sitting down on and standing up from a chair with arms (e.g., wheelchair, bedside commode, etc.): 3  Moving from lying on back to sitting on the side of the bed?: 3  Moving to and from a bed to a chair (including a wheelchair)?: 3  Need to walk in hospital room?: 3  Climbing 3-5 steps with a railing?: 3  Basic Mobility Total Score: 18       Therapeutic Activities and Exercises:  Patient ascend/descend 4 steps with R HR with CGA for safety  Educated patient on the importance of slowing down and making sure to maintain posterior precautions.  Patient was able to perform self cleaning and maintain posterior precautions; patient stood at sink with SBA for hand hygiene.      Patient left up in chair with all lines intact, call button in reach, nurse notified and son present  present..    GOALS:   Multidisciplinary Problems     Physical Therapy Goals        Problem: Physical Therapy Goal    Goal Priority Disciplines Outcome Goal Variances Interventions   Physical Therapy Goal     PT, PT/OT      Description:  Goals to be met by: 9/24/18    Patient will increase functional independence with mobility by  performin. Supine to sit with SBA   2. Sit to supine with SBA.   3. Sit<>stand transfer with SBA using rolling walker.   4. Gait > 150 feet with SBA using rolling walker. MET 18  5. Ascend/descend 3 stairs with R  handrails with min a MET 18                       Time Tracking:     PT Received On: 18  PT Start Time: 1050     PT Stop Time: 1130  PT Total Time (min): 40 min     Billable Minutes: Gait Training 25 and Therapeutic Activity 15    Treatment Type: Treatment  PT/PTA: PTA     PTA Visit Number: 1     Robyn Bruno, PTA  2018

## 2018-09-11 NOTE — PLAN OF CARE
Problem: Physical Therapy Goal  Goal: Physical Therapy Goal  Goals to be met by: 18    Patient will increase functional independence with mobility by performin. Supine to sit with SBA   2. Sit to supine with SBA.   3. Sit<>stand transfer with SBA using rolling walker.   4. Gait > 150 feet with SBA using rolling walker.   5. Ascend/descend 3 stairs with R  handrails with min a    Outcome: Ongoing (interventions implemented as appropriate)  PT eval.  Able to amb with RW and A after given IV dilaudid.  May need both sessions tomorrow  REC:  Home with HH  DME:  Needs RW and 3 in 1.  States they will order TTB on line and thinks she does not need  hip kit

## 2018-09-11 NOTE — NURSING
Dc'ed flavio, clear yellow urine, 525cc, pt verbalized understanding to call for assist when needing to urinate.

## 2018-09-11 NOTE — PLAN OF CARE
Problem: Patient Care Overview  Goal: Plan of Care Review  Outcome: Ongoing (interventions implemented as appropriate)  Pt VSS and afebrile, pt complained of uncontrolled pain throughout shift. Pt positions self in bed. Pt remains with ice to left hip, muniz removed at 0600. Pt family at bedside, in low locked bed, call light in reach, safety precautions maintained, will continue to monitor.

## 2018-09-11 NOTE — PLAN OF CARE
Patient accepted by [facility]      09/11/18 1049   Final Note   Assessment Type Final Discharge Note   Discharge Disposition Home-Health   What phone number can be called within the next 1-3 days to see how you are doing after discharge? [phone]   Discharge plans and expectations educations in teach back method with documentation complete? Yes   Right Care Referral Info   Post Acute Recommendation Home-care   Facility Name [facility]

## 2018-09-11 NOTE — PLAN OF CARE
DME - delivered 9/11/18 Room 358 [id number]  One - BS-commmicky   One - Jr. Rolling Walker   One - Trans Bench  One - Short- Hip Kit Jamie Ballard, [facility] [place]  Case Management  [phone]

## 2018-09-11 NOTE — PROGRESS NOTES
IM  Progress Note    Admit Date: 9/10/2018   LOS: 1 day     SUBJECTIVE:     Follow-up For:  Artificial joint pain    Interval History:     POD #1 left hip repair.  Patient seen just after working with OT.  Denies CP,SOB,F,C,N or V.  No calf tenderness. Due to void, will likely discharge today.    Review of Systems:  Constitutional: No fever or chills  Respiratory: No cough or shortness of breath  Cardiovascular: No chest pain or palpitations  Gastrointestinal: No nausea or vomiting  Neurological: No confusion or weakness    OBJECTIVE:     Vital Signs Range (Last 24H):  BP (!) 105/57 (BP Location: Right arm, Patient Position: Lying)   Pulse 81   Temp 97.5 °F (36.4 °C) (Oral)   Resp 18   Ht 5' (1.524 m)   Wt 90.7 kg (199 lb 16 oz)   SpO2 96%   BMI 39.06 kg/m²     Temp:  [97.5 °F (36.4 °C)-98.8 °F (37.1 °C)]   Pulse:  []   Resp:  [18]   BP: (101-134)/(52-69)   SpO2:  [94 %-100 %]     I & O (Last 24H):    Intake/Output Summary (Last 24 hours) at 9/11/2018 0844  Last data filed at 9/11/2018 0530  Gross per 24 hour   Intake 3541.67 ml   Output 2525 ml   Net 1016.67 ml       Physical Exam:  General appearance: Well developed, well nourished  Eyes:  Conjunctivae/corneas clear. PERRL.  Lungs: Normal respiratory effort,   clear to auscultation bilaterally   Heart: Regular rate and rhythm, S1, S2 normal, no murmur, rub or nadia.  Abdomen: Soft, non-tender non-distended; bowel sounds normal; no masses,  no organomegaly  Extremities: No cyanosis or clubbing. No edema.  +2 pulses BLE  Skin: Skin color, texture, turgor normal. No rashes or lesions, JAZZ dressing left hip CDI  Neurologic: Normal strength and tone. No focal numbness or weakness     Laboratory Data:  Recent Labs   Lab  09/11/18   0457   WBC  15.81*   RBC  4.29   HGB  12.0   HCT  36.7*   PLT  297   MCV  86   MCH  28.0   MCHC  32.7       BMP: No results for input(s): GLU, NA, K, CL, CO2, BUN, CREATININE, CALCIUM, MG in the last 168 hours.    Invalid  input(s):  PHOS  Lab Results   Component Value Date    CALCIUM 9.8 08/06/2018               Medications:  Medication list was reviewed and changes noted under Assessment/Plan.    Diagnostic Results:        ASSESSMENT/PLAN:     1. Left hip repair (T84.84XA): POD #1 per therapy and ortho teams  2. Leukocytosis (D72.829): noted on pre-op labs, continued today.  No fever, chills or cough, likely due to surgical intervention.  3. Elevated hemoglobin (D58.2): noted on pre-op labs, resolved today  4. Anxiety (F41.9): continue home med but once daily PRN dosing  5. ADD (F98.8): defer  6. Vit D Deficiency (E55.9): resume ergocalciferol after discharge  7. DVT prophy: will discharge on  mg BID and TEDs per ortho    Medically stable for discharge today

## 2018-09-11 NOTE — PLAN OF CARE
Problem: Occupational Therapy Goal  Goal: Occupational Therapy Goal  Goals to be met by: 10/11/18     Patient will increase functional independence with ADLs by performing:    Grooming while standing at sink with Supervision.  Toileting from bedside commode with Stand-by Assistance for hygiene and clothing management.   Toilet transfer to bedside commode with Stand-by Assistance.    Outcome: Ongoing (interventions implemented as appropriate)  OT evaluation completed and treatment initiated.  Pt would benefit from skilled occupational therapy intervention for increased activity tolerance and independence in ADL's.

## 2018-09-11 NOTE — PT/OT/SLP EVAL
Physical Therapy Evaluation and treatment    Patient Name:  Mary Carias   MRN:  [id number]    Recommendations:     Discharge Recommendations:  home with home health, home health PT, home health OT   Discharge Equipment Recommendations: 3-in-1 commode, bath bench, hip kit, walker, rolling( States they will order TTB on line and thinks she does not need  hip kit )   Barriers to discharge: Inaccessible home    Assessment:     Mary Carias is a 47 y.o. female admitted with a medical diagnosis of Artificial joint pain.  She presents with the following impairments/functional limitations:  weakness, impaired endurance, impaired functional mobilty, decreased safety awareness, pain, decreased lower extremity function, decreased ROM, orthopedic precautions .  Repeated cues for observation of hip precautions and safety, requiring IV pain meds, may need both sessions tomorrow    Rehab Prognosis:  good; patient would benefit from acute skilled PT services to address these deficits and reach maximum level of function.      Recent Surgery: Procedure(s) (LRB):  ARTHROPLASTY, HIP CONVERSION -REVISION TO TOTAL (Left) Day of Surgery    Plan:     During this hospitalization, patient to be seen BID to address the above listed problems via gait training, therapeutic activities, therapeutic exercises  · Plan of Care Expires:  10/10/18   Plan of Care Reviewed with: patient, friend    Subjective     Communicated with nurse prior to session.  Patient found sitting up in bed  upon PT entry to room, agreeable to evaluation.  Pt had ice packs sitting on skin with nothing between ice packs and skin, skin very red.  Instructed pt that can not be placed directly on skin    Chief Complaint: hip and thigh pain  Patient comments/goals: states she put the ice on her thigh cause it was killing her.  OK lets do this.  Bam look at me.  I got this.  At end of session-now Im ready for some more Dilaudid.  I need that to  "take away my pain.   Pain/Comfort:  · Pain Rating 1: 5/10  · Location - Side 1: Left  · Location - Orientation 1: generalized  · Location 1: hip(and thigh)  · Pain Addressed 1: Pre-medicate for activity, Reposition, Distraction, Cessation of Activity, Nurse notified(given IV Dilaudid prior to treatment but requesting more at end of session)  · Pain Rating Post-Intervention 1: 4/10    Patients cultural, spiritual, Sikhism conflicts given the current situation: none stated    Living Environment:  Pt lives with spouse in 1 story house with 3 RUTH [address] and no HR.  Tub shower combo and low toilet.  When asked if spouse works pt stated "sometimes"  Prior to admission, patients level of function was I PTA, no AD, drives, would order groceries on line and drive to , can heat up food, cant stand for long to cook.   Friend who is present states  would put pt shoes and socks on. .  Patient has the following equipment: none.  DME owned (not currently used): none.  Upon discharge, patient will have assistance from friend( who will be staying for 1 week and limited from   spouse     Objective:     Patient found with: hip abduction pillow, muniz catheter, peripheral IV, SCD(JAZZ vac)     General Precautions: Standard, fall   Orthopedic Precautions:LLE weight bearing as tolerated, LLE posterior precautions   Braces: N/A     Exams:  · Cognitive Exam:  Patient is oriented to Person, Place, Time and Situation  · Gross Motor Coordination:  impaired in timing  · Postural Exam:  Patient presented with the following abnormalities:    · -       Rounded shoulders  · Sensation:    · -       Intact  light/touch BLE  · Skin Integrity/Edema:      · -       Skin integrity: L hip inciaon bandaged and JAZZ vac in place.  skin on thigh  is red from ice pack  · -       Edema: L thigh   · RLE ROM: WFL  · RLE Strength: WFL  · LLE ROM: Deficits: decreased 2* pain and hip precautions, ankle WFL\  · LLE Strength: Deficits: fair quad " contraction for glut and quad, assist to move leg, ankle WFL     Functional Mobility:  · Bed Mobility:     · Supine to Sit: moderate assistance and cues for hip precautions  · Sit to Supine: minimum assistance with side rail  · Transfers:     · Sit to Stand:  minimum assistance with rolling walker and cues for hand placement and observation of hip precautions  · Gait: pt amb 100' with RW and CGA.  Instructed in heel toe reciprocal gait.  Initially step to gait stopping after each step.  assist to push RW to try to get pt to fluid step thru gait.  Decreased bonita.  Cues for hip precautions-will start to turn around to look at friend with feet planted   · Balance: fair standing with RW    AM-PAC 6 CLICK MOBILITY  Total Score:16       Therapeutic Activities and Exercises:   PT eval.  Instructed in hip precautions, gt sequence and walker management.  Required repeated reinforcement of all.  Instruct in and performed 1 set of 10  ankle pumps  Quad sets  Glut sets      Patient left HOB elevated with all lines intact, call button in reach, bed alarm on, nursing notified and friend present.    GOALS:   Multidisciplinary Problems     Physical Therapy Goals        Problem: Physical Therapy Goal    Goal Priority Disciplines Outcome Goal Variances Interventions   Physical Therapy Goal     PT, PT/OT Ongoing (interventions implemented as appropriate)     Description:  Goals to be met by: 18    Patient will increase functional independence with mobility by performin. Supine to sit with SBA   2. Sit to supine with SBA.   3. Sit<>stand transfer with SBA using rolling walker.   4. Gait > 150 feet with SBA using rolling walker.   5. Ascend/descend 3 stairs with R  handrails with min a                      History:     Past Medical History:   Diagnosis Date    ADD (attention deficit disorder)     Arthritis     Chronic hip pain     Depression        Past Surgical History:   Procedure Laterality Date    BELT  ABDOMINOPLASTY       SECTION      hip reconstruction      hip reconstruction      tummy tuck         Clinical Decision Making:     History  Co-morbidities and personal factors that may impact the plan of care Examination  Body Structures and Functions, activity limitations and participation restrictions that may impact the plan of care Clinical Presentation   Decision Making/ Complexity Score   Co-morbidities:   [] Time since onset of injury / illness / exacerbation  [] Status of current condition  []Patient's cognitive status and safety concerns    [] Multiple Medical Problems (see med hx)  Personal Factors:   [] Patient's age  [] Prior Level of function   [] Patient's home situation (environment and family support)  [] Patient's level of motivation  [] Expected progression of patient      HISTORY:(criteria)    [] 82335 - no personal factors/history    [] 50783 - has 1-2 personal factor/comorbidity     [] 69947 - has >3 personal factor/comorbidity     Body Regions:  [] Objective examination findings  [] Head     []  Neck  [] Trunk   [] Upper Extremity  [] Lower Extremity    Body Systems:  [] For communication ability, affect, cognition, language, and learning style: the assessment of the ability to make needs known, consciousness, orientation (person, place, and time), expected emotional /behavioral responses, and learning preferences (eg, learning barriers, education  needs)  [] For the neuromuscular system: a general assessment of gross coordinated movement (eg, balance, gait, locomotion, transfers, and transitions) and motor function  (motor control and motor learning)  [] For the musculoskeletal system: the assessment of gross symmetry, gross range of motion, gross strength, height, and weight  [] For the integumentary system: the assessment of pliability(texture), presence of scar formation, skin color, and skin integrity  [] For cardiovascular/pulmonary system: the assessment of heart rate,  respiratory rate, blood pressure, and edema     Activity limitations:    [] Patient's cognitive status and saf ety concerns          [] Status of current condition      [] Weight bearing restriction  [] Cardiopulmunary Restriction    Participation Restrictions:   [] Goals and goal agreement with the patient     [] Rehab potential (prognosis) and probable outcome      Examination of Body System: (criteria)    [] 57313 - addressing 1-2 elements    [] 67302 - addressing a total of 3 or more elements     [] 86393 -  Addressing a total of 4 or more elements         Clinical Presentation: (criteria)  Choose one     On examination of body system using standardized tests and measures patient presents with (CHOOSE ONE) elements from any of the following: body structures and functions, activity limitations, and/or participation restrictions.  Leading to a clinical presentation that is considered stable and/or uncomplicated                              Clinical Decision Making  (Eval Complexity):  Low- 17326     Time Tracking:     PT Received On: 09/10/18  PT Start Time: 1945     PT Stop Time: 2039  PT Total Time (min): 54 min     Billable Minutes: Evaluation 15, Gait Training 23 and Therapeutic Activity 16      Tosin Browne, PT  09/10/2018

## 2018-09-11 NOTE — PT/OT/SLP EVAL
"Occupational Therapy   Evaluation and Treatment    Name: Mary Carias  MRN: [id number]  Admitting Diagnosis:  Artificial joint pain 1 Day Post-Op    Recommendations:     Discharge Recommendations: home with home health, home health PT, home health OT  Discharge Equipment Recommendations:  3-in-1 commode, bath bench, hip kit, walker, rolling  Barriers to discharge:  Decreased caregiver support    History:     Occupational Profile:  Per PT note and confirmation with pt: "Pt lives with spouse in 1 story house with 3 RUTH ANN and no HR.  Tub shower combo and low toilet.  When asked if spouse works pt stated "sometimes"  Prior to admission, patients level of function was I PTA, no AD, drives, would order groceries on line and drive to , can heat up food, cant stand for long to cook.   Friend who is present states  would put pt shoes and socks on. .  Patient has the following equipment: none.  DME owned (not currently used): none.  Upon discharge, patient will have assistance from girl friends ( who will be staying for 1 week and limited from spouse. Pt stated she will not have her  to assist her        Past Medical History:   Diagnosis Date    ADD (attention deficit disorder)     Arthritis     Chronic hip pain     Depression          Past Surgical History:   Procedure Laterality Date    BELT ABDOMINOPLASTY       SECTION      hip reconstruction      hip reconstruction      tummy tuck         Subjective     Chief Complaint: Pain   Patient/Family Comments/goals: Pt very anxious about pain this session.     Pain/Comfort:  Pain Rating 1: 6/10  Location - Side 1: Left  Location - Orientation 1: generalized  Location 1: thigh  Pain Addressed 1: Pre-medicate for activity, Reposition, Distraction, Cessation of Activity  Pain Rating Post-Intervention 1: 4/10(pt stated decreased pain with ambulation)    Patients cultural, spiritual, Gnosticist conflicts given the current situation: " none specified    Objective:     Communicated with: nursing prior to session.  Patient with peripheral IV, hip abduction pillow, munzi catheter, SCD upon OT entry to room. Pt very anxious and tearful this session. Pt very distracted demonstrating unsafe behaviors.     General Precautions: Standard, fall   Orthopedic Precautions:LLE weight bearing as tolerated, LLE posterior precautions   Braces: N/A     Occupational Performance:    Bed Mobility:    · Patient completed Supine to Sit with stand by assistance for safety    Functional Mobility/Transfers:  · Patient completed Sit <> Stand Transfer with contact guard assistance  with  rolling walker   · Patient completed Bed <> Chair Transfer using Step Transfer technique with contact guard assistance with rolling walker  · Functional Mobility: CGA for safety with rolling walker and verbal cues for safety initially.     Activities of Daily Living:  · Upper Body Dressing: Modified independence   · Lower Body Dressing: education on hip kit and  following required set up assistance.     Cognitive/Visual Perceptual:  Cognitive/Psychosocial Skills:     -       Oriented to: Person, Place and Time   -       Follows Commands/attention:Easily distracted and Follows one-step commands  -       Communication: clear/fluent  -       Memory: easily distracted impeading recall  -       Safety awareness/insight to disability: impaired   -       Mood/Affect/Coping skills/emotional control: Labile, Tearful and Anxious  Visual/Perceptual:      -Intact wears glassess    Physical Exam:  Postural examination/scapula alignment:    -       Rounded shoulders  -       Forward head  Skin integrity: Visible skin intact  Edema:  none noted BUE's  Sensation:    -       Intact .  Motor Planning:    -       fair+  Dominant hand:    -       right  Upper Extremity Range of Motion:     -       Right Upper Extremity: WFL  -       Left Upper Extremity: WFL   Upper Extremity Strength:    -       Right Upper  "Extremity: WFL  -       Left Upper Extremity: WFL    Strength:    -       Right Upper Extremity: WFL  -       Left Upper Extremity: WFL   Fine Motor Coordination:    -       Intact    AMPAC 6 Click ADL:  AMPAC Total Score: 21    Treatment & Education:  Bed mobility, safety with transfers and mobility, education on OT role, POC, use of hip kit and DME.   Education:    Patient left up in chair with call button in reach and nursing notified    Assessment:     Mary Carias is a 47 y.o. female with a medical diagnosis of Artificial joint pain.  She presents with the following performance deficits affecting function: weakness, impaired endurance, impaired self care skills, decreased ROM, pain, decreased safety awareness, impaired functional mobilty.      Rehab Prognosis: Good -; patient would benefit from acute skilled OT services to address these deficits and reach maximum level of function.         Clinical Decision Makin.  OT Low:  "Pt evaluation falls under low complexity for evaluation coding due to performance deficits noted in 1-3 areas as stated above and 0 co-morbities affecting current functional status. Data obtained from problem focused assessments. No modifications or assistance was required for completion of evaluation. Only brief occupational profile and history review completed."     Plan:     Patient to be seen daily   to address the above listed problems via self-care/home management, therapeutic activities, therapeutic exercises  · Plan of Care Expires: 10/11/18  · Plan of Care Reviewed with: patient    This Plan of care has been discussed with the patient who was involved in its development and understands and is in agreement with the identified goals and treatment plan    GOALS:   Multidisciplinary Problems     Occupational Therapy Goals        Problem: Occupational Therapy Goal    Goal Priority Disciplines Outcome Interventions   Occupational Therapy Goal     OT, PT/OT " Ongoing (interventions implemented as appropriate)    Description:  Goals to be met by: 10/11/18     Patient will increase functional independence with ADLs by performing:    Grooming while standing at sink with Supervision.  Toileting from bedside commode with Stand-by Assistance for hygiene and clothing management.   Toilet transfer to bedside commode with Stand-by Assistance.                      Time Tracking:     OT Date of Treatment: 09/11/18  OT Start Time: 0829  OT Stop Time: 0936  OT Total Time (min): 67 min    Billable Minutes:Evaluation 14  Self Care/Home Management 30  Therapeutic Activity 23    Tad Drake OT  9/11/2018

## 2018-09-11 NOTE — PLAN OF CARE
Patient voiced her home health preference as [facility] - referral forwarded via [facility] from [facility] gave approval to pull haley rolling walker & bedside commode from our supply - [employer] [facility] to deliver to bedside along with tub transfer bench & short hip kit that patient agreed to purchase

## 2018-09-11 NOTE — PLAN OF CARE
SW met with pt at bedside to complete discharge assessment, verified PCP and uses [facility].  Pt needs a jr. RW, BSC, hip kit, short and TTB.  Pt agreed to be billed $32 and $75 for HK and TTB.         09/11/18 0921   Discharge Assessment   Assessment Type Discharge Planning Assessment   Confirmed/corrected address and phone number on facesheet? Yes   Assessment information obtained from? Patient   Communicated expected length of stay with patient/caregiver no   Prior to hospitilization cognitive status: Alert/Oriented   Prior to hospitalization functional status: Independent   Current cognitive status: Alert/Oriented   Current Functional Status: Assistive Equipment;Needs Assistance   Lives With spouse   Able to Return to Prior Arrangements yes   Is patient able to care for self after discharge? Unable to determine at this time (comments)   Patient's perception of discharge disposition home health   Readmission Within The Last 30 Days no previous admission in last 30 days   Patient currently being followed by outpatient case management? No   Patient currently receives any other outside agency services? No   Equipment Currently Used at Home none   Do you have any problems affording any of your prescribed medications? No   Is the patient taking medications as prescribed? yes   Does the patient have transportation home? Yes   Transportation Available family or friend will provide   Does the patient receive services at the [facility]? No   Discharge Plan A Home Health   Patient/Family In Agreement With Plan yes

## 2018-09-17 NOTE — DISCHARGE SUMMARY
[facility]  Short Stay  Discharge Summary  TOTAL HIP REPLACEMENT    Admit Date: 9/10/2018    Discharge Date and Time: 9/11/2018 12:48 PM      Discharge Attending Physician: Maicol Hogan MD     Hospital Course:  Mary Carias,is a 47 y.o. female with severe post-traumatic osteoarthritis,   L hip, unrelieved with the conservative measures. The patient was admitted on 9/10/2018 underwent a conversion L total hip replacement.  Postoperatively, the patient did well and was weightbearing as tolerated with a walker. Mary Carias was instructed on hip precautions.  The patient was discharged on 9/11/2018 with home health physical therapy and nursing. The patient will be on Percocet for pain and aspirin 325 mg p.o. b.i.d. with meals x4 weeks. I will see them back in the office in 4 weeks for followup.      Final Diagnoses:    Principal Problem: Artificial joint pain   Secondary Diagnoses:   Active Hospital Problems    Diagnosis  POA    Artificial joint pain [T84.84XA]  Yes      Resolved Hospital Problems    Diagnosis Date Resolved POA    *Artificial joint pain [T84.84XA] 09/10/2018 Yes       Discharged Condition: good    [place] [facility]    [place] up/Patient Instructions:    Medications:  Reconciled Home Medications:      Medication List      START taking these medications    aspirin 325 MG tablet  Take 1 tablet (325 mg total) by mouth every 12 (twelve) hours. For 4 weeks     oxyCODONE-acetaminophen  mg per tablet  Commonly known as:  PERCOCET  Take 1 to 2 tablets by mouth every 6 hours as needed for pain        CONTINUE taking these medications    clonazePAM 0.5 MG tablet  Commonly known as:  KLONOPIN  Take 1 tablet (0.5 mg total) by mouth 2 (two) times daily as needed for Anxiety.     dextroamphetamine-amphetamine 10 mg Tab  Commonly known as:  ADDERALL  1 tablet PO BID     ergocalciferol 50,000 unit Cap  Commonly known as:  ERGOCALCIFEROL  Take 1 capsule  "(50,000 Units total) by mouth every 7 days.     ondansetron 4 MG tablet  Commonly known as:  ZOFRAN  Take 1 tablet (4 mg total) by mouth every 8 (eight) hours as needed for Nausea.     oxybutynin 5 MG Tr24  Commonly known as:  DITROPAN-XL  Take 1 tablet (5 mg total) by mouth once daily.     triamcinolone acetonide 0.1% 0.1 % cream  Commonly known as:  KENALOG  Apply topically 2 (two) times daily.        STOP taking these medications    diclofenac sodium 1 % Gel  Commonly known as:  VOLTAREN     HYDROcodone-acetaminophen 5-325 mg per tablet  Commonly known as:  NORCO     traMADol 50 mg tablet  Commonly known as:  ULTRAM          Discharge Procedure Orders   WALKER FOR HOME USE     Order Specific Question Answer Comments   Type of Walker: Adult (5'4"-6'6")    With wheels? Yes    Height: 5' (1.524 m)    Weight: 90.7 kg (199 lb 16 oz)    Does patient have medical equipment at home? none    Length of need (1-99 months): 99      3 IN 1 COMMODE FOR HOME USE     Order Specific Question Answer Comments   Type: Standard    Height: 5' (1.524 m)    Weight: 90.7 kg (199 lb 16 oz)    Does patient have medical equipment at home? none    Length of need (1-99 months): 99      CANE FOR HOME USE     Order Specific Question Answer Comments   Type of Cane: Straight    Height: 5' (1.524 m)    Weight: 90.7 kg (199 lb 16 oz)    Does patient have medical equipment at home? none    Length of need (1-99 months): 99      COMMODE FOR HOME USE     Order Specific Question Answer Comments   Type: Standard    Height: 5' (1.524 m)    Weight: 90.7 kg (199 lb 16 oz)    Does patient have medical equipment at home? none    Length of need (1-99 months): 99      WALKER FOR HOME USE     Order Specific Question Answer Comments   Type of Walker: Rubin (4'4"-5'7")    With wheels? Yes    Height: 5' (1.524 m)    Weight: 90.7 kg (199 lb 16 oz)    Length of need (1-99 months): 99    Does patient have medical equipment at home? none    Please check all that " apply: Patient's condition impairs ambulation.    Please check all that apply: Patient is unable to safely ambulate without equipment.    Please check all that apply: Patient needs help to get in and out of chair.    Please check all that apply: Walker will be used for gait training.      HIP KIT FOR HOME USE     Order Specific Question Answer Comments   Height: 5' (1.524 m)    Weight: 90.7 kg (199 lb 16 oz)    Does patient have medical equipment at home? none    Length of need (1-99 months): 99    Type: Short [place] Hip Kit      TRANSFER TUB BENCH FOR HOME USE     Order Specific Question Answer Comments   Type of Transfer Tub Bench: Unpadded    Height: 5' (1.524 m)    Weight: 90.7 kg (199 lb 16 oz)    Does patient have medical equipment at home? none    Length of need (1-99 months): 99      Follow-up Information     Maicol Hogan MD In 4 weeks.    Specialty:  Orthopedic Surgery  Contact information:  [address]  [facility]  [facility] [address]  [phone]             [facility].    Specialty:  Home Health Services  Why:  Home Health   Contact information:  [address] [place] [address]  [phone]             [facility]    Specialty:  DME Provider  Why:  haley rolling walker, bedside commode, tub transfer bench, & short hip kit   Contact information:  [address] [facility] [address]  [facility] [address]  [phone]

## 2021-08-17 ENCOUNTER — CLINICAL SUPPORT (OUTPATIENT)
Dept: URGENT CARE | Facility: CLINIC | Age: 51
End: 2021-08-17
Payer: MEDICARE

## 2021-08-17 DIAGNOSIS — Z11.59 ENCOUNTER FOR SCREENING FOR OTHER VIRAL DISEASES: Primary | ICD-10-CM

## 2021-08-17 LAB
CTP QC/QA: YES
SARS-COV-2 RDRP RESP QL NAA+PROBE: NEGATIVE

## 2021-08-17 PROCEDURE — U0002: ICD-10-PCS | Mod: QW,CR,S$GLB, | Performed by: PHYSICIAN ASSISTANT

## 2021-08-17 PROCEDURE — U0002 COVID-19 LAB TEST NON-CDC: HCPCS | Mod: QW,CR,S$GLB, | Performed by: PHYSICIAN ASSISTANT

## 2024-09-14 NOTE — PT/OT/SLP DISCHARGE
Occupational Therapy Discharge Summary    Mary Carias  MRN: [id number]   Principal Problem: Artificial joint pain      Patient Discharged from acute Occupational Therapy on 9/11/18.  Please refer to prior OT note dated 9/11/18 for functional status.    Assessment:      Patient appropriate for care in another setting.    Objective:     GOALS:   Multidisciplinary Problems     Occupational Therapy Goals     Not on file          Multidisciplinary Problems (Resolved)        Problem: Occupational Therapy Goal    Goal Priority Disciplines Outcome Interventions   Occupational Therapy Goal   (Resolved)     OT, PT/OT Outcome(s) achieved    Description:  Goals to be met by: 10/11/18     Patient will increase functional independence with ADLs by performing:    Grooming while standing at sink with Supervision.  Toileting from bedside commode with Stand-by Assistance for hygiene and clothing management.   Toilet transfer to bedside commode with Stand-by Assistance.                      Reasons for Discontinuation of Therapy Services  Transfer to alternate level of care.      Plan:     Patient Discharged to: Home with Home Health Service    Tad Drake OT  9/11/2018   Her/She

## (undated) DEVICE — SOL IRR NACL .9% 3000ML

## (undated) DEVICE — SYR 30CC LUER LOCK

## (undated) DEVICE — HOOD T-5 TEAR AWAY STERILE

## (undated) DEVICE — DRAPE INCISE IOBAN 2 23X17IN

## (undated) DEVICE — UNDERGLOVES BIOGEL PI SZ 7 LF

## (undated) DEVICE — NDL SAFETY 22G X 1.5 ECLIPSE

## (undated) DEVICE — SUT VICRYL+ 1 CTX 18IN VIOL

## (undated) DEVICE — SOL 9P NACL IRR PIC IL

## (undated) DEVICE — SUT STRATAFIX PDS 1 CTX 18IN

## (undated) DEVICE — DRESSING COVER AQUACEL AG SURG

## (undated) DEVICE — Device

## (undated) DEVICE — ALCOHOL 70% ISOP W/GREEN 16OZ

## (undated) DEVICE — KIT IRR SUCTION HND PIECE

## (undated) DEVICE — SEE MEDLINE ITEM 157117

## (undated) DEVICE — COVER MAYO STAND REINFRCD 30

## (undated) DEVICE — DRESSING TRANS 4X10 TEGADERM

## (undated) DEVICE — POSITIONER IV ARMBOARD FOAM

## (undated) DEVICE — BLADE SAG DUAL 18MMX1.27MMX90M

## (undated) DEVICE — UNDERGLOVE BIOGEL PI SZ 6.5 LF

## (undated) DEVICE — GLOVE BIOGEL SKINSENSE PI 8.5

## (undated) DEVICE — GLOVE BIOGEL SKINSENSE PI 7.0

## (undated) DEVICE — APPLICATOR CHLORAPREP ORN 26ML

## (undated) DEVICE — SUT VICRYL PLUS 2-0 CT1 18

## (undated) DEVICE — COVER BACK TABLE 72X21

## (undated) DEVICE — DRESSING PICO 4X12 ACT SZ 2X10

## (undated) DEVICE — DRESSING LEUKOPLAST FLEX 1X3IN

## (undated) DEVICE — TRAY FOLEY 16FR INFECTION CONT

## (undated) DEVICE — SUT ETHIBOND EXCEL 5-0 V-40

## (undated) DEVICE — STAPLER SKIN PROXIMATE WIDE

## (undated) DEVICE — SEE MEDLINE ITEM 161288

## (undated) DEVICE — SHEET HIP ABSORB CIRC ELAS 8

## (undated) DEVICE — SEE MEDLINE ITEM 153151

## (undated) DEVICE — CONTAINER SPECIMEN STRL 4OZ

## (undated) DEVICE — DRAPE STERI INSTRUMENT 1018

## (undated) DEVICE — SPONGE GAUZE 16PLY 4X4

## (undated) DEVICE — SEE MEDLINE ITEM 157131

## (undated) DEVICE — DRAPE CAMERA/VIDEO 5 X 96

## (undated) DEVICE — UNDERGLOVES BIOGEL PI SIZE 7.5

## (undated) DEVICE — PILLOW ABDUCTION MED

## (undated) DEVICE — ELECTRODE REM PLYHSV RETURN 9

## (undated) DEVICE — UNDERGLOVES BIOGEL PI SIZE 8.5

## (undated) DEVICE — DRESSING AQUACEL AG 3.5X10IN

## (undated) DEVICE — DRAPE STERI U-SHAPED 47X51IN